# Patient Record
Sex: FEMALE | Race: WHITE | NOT HISPANIC OR LATINO | Employment: UNEMPLOYED | URBAN - METROPOLITAN AREA
[De-identification: names, ages, dates, MRNs, and addresses within clinical notes are randomized per-mention and may not be internally consistent; named-entity substitution may affect disease eponyms.]

---

## 2017-02-05 ENCOUNTER — HOSPITAL ENCOUNTER (EMERGENCY)
Facility: HOSPITAL | Age: 27
Discharge: HOME | End: 2017-02-05
Attending: EMERGENCY MEDICINE | Admitting: EMERGENCY MEDICINE

## 2017-02-05 ENCOUNTER — APPOINTMENT (OUTPATIENT)
Dept: CT IMAGING | Facility: HOSPITAL | Age: 27
End: 2017-02-05

## 2017-02-05 VITALS
SYSTOLIC BLOOD PRESSURE: 122 MMHG | RESPIRATION RATE: 16 BRPM | TEMPERATURE: 98.1 F | HEIGHT: 67 IN | DIASTOLIC BLOOD PRESSURE: 68 MMHG | BODY MASS INDEX: 27.47 KG/M2 | OXYGEN SATURATION: 100 % | WEIGHT: 175 LBS | HEART RATE: 78 BPM

## 2017-02-05 DIAGNOSIS — S06.0X0A CONCUSSION, WITHOUT LOSS OF CONSCIOUSNESS, INITIAL ENCOUNTER: ICD-10-CM

## 2017-02-05 DIAGNOSIS — S00.03XA SCALP CONTUSION: Primary | ICD-10-CM

## 2017-02-05 PROCEDURE — 70450 CT HEAD/BRAIN W/O DYE: CPT

## 2017-02-05 PROCEDURE — 99283 EMERGENCY DEPT VISIT LOW MDM: CPT

## 2017-02-05 NOTE — ED NOTES
"Patient states last night she hit her head on the corner cabinet with no loss of consciousness, and it has progressed into a headache and nausea \"and I feel unbalanced, kind of like I'm outside my body\" ever since last night, also confused, \"and my eyes feel heavy\".      Fabienne Torres RN  02/05/17 5822    "

## 2017-02-05 NOTE — ED PROVIDER NOTES
" EMERGENCY DEPARTMENT ENCOUNTER    CHIEF COMPLAINT  Chief Complaint: Head injury  History given by: Patient  History limited by: Nothing  Room Number: 01/01  PMD: Provider Not In System      HPI:  Pt is a 26 y.o. female who presents complaining of a head injury onset yesterday night. The pt states she hit her head on the corner of a cabinet. The pt states she did not have LOC, but has had HA since the injury. Pt report HA, nausea, photophobia, unsteady gait, and neck pain. The pt states she took 3 Aleve at home and it helped her pain. The pt states she has had 3 concussions in the past.    Duration: Since the injury yesterday night  Onset: Gradual  Timing: Constant  Location: Head  Radiation: None  Quality: \"Pain\"  Intensity/Severity: Moderate  Progression: Unchanged  Associated Symptoms: HA, nausea, photophobia, unsteady gait, and neck pain.  Aggravating Factors: Nothing  Alleviating Factors: Nothing  Previous Episodes: The pt has had 3 concussion in the past.  Treatment before arrival: The pt took 3 Aleve at home and it helped her pain.    PAST MEDICAL HISTORY  Active Ambulatory Problems     Diagnosis Date Noted   • No Active Ambulatory Problems     Resolved Ambulatory Problems     Diagnosis Date Noted   • No Resolved Ambulatory Problems     Past Medical History   Diagnosis Date   • Lyme disease        PAST SURGICAL HISTORY  Past Surgical History   Procedure Laterality Date   • Finger surgery         FAMILY HISTORY  History reviewed. No pertinent family history.    SOCIAL HISTORY  Social History     Social History   • Marital status: Single     Spouse name: N/A   • Number of children: N/A   • Years of education: N/A     Occupational History   • Not on file.     Social History Main Topics   • Smoking status: Never Smoker   • Smokeless tobacco: Not on file   • Alcohol use No   • Drug use: No   • Sexual activity: Not on file     Other Topics Concern   • Not on file     Social History Narrative   • No narrative on " file       ALLERGIES  Review of patient's allergies indicates no known allergies.    REVIEW OF SYSTEMS  Review of Systems   Constitutional: Negative for chills and fever.   HENT: Negative for sore throat and trouble swallowing.    Eyes: Positive for photophobia. Negative for visual disturbance.   Respiratory: Negative for cough and shortness of breath.    Cardiovascular: Negative for chest pain, palpitations and leg swelling.   Gastrointestinal: Positive for nausea. Negative for abdominal pain, diarrhea and vomiting.   Endocrine: Negative.    Genitourinary: Negative for decreased urine volume, dysuria and frequency.   Musculoskeletal: Positive for neck pain.   Skin: Negative for rash.   Allergic/Immunologic: Negative.    Neurological: Positive for headaches. Negative for syncope, weakness and numbness.        Unsteady gait   Hematological: Negative.    Psychiatric/Behavioral: Negative.    All other systems reviewed and are negative.      PHYSICAL EXAM  ED Triage Vitals   Temp Heart Rate Resp BP SpO2   02/05/17 1514 02/05/17 1514 02/05/17 1514 02/05/17 1522 02/05/17 1514   98.7 °F (37.1 °C) 97 16 114/73 99 %      Temp src Heart Rate Source Patient Position BP Location FiO2 (%)   02/05/17 1514 02/05/17 1514 -- -- --   Tympanic Monitor          Physical Exam   Constitutional: She is oriented to person, place, and time and well-developed, well-nourished, and in no distress.   HENT:   Head: Normocephalic.   Eyes: EOM are normal. Pupils are equal, round, and reactive to light.   The pt has a sensitivity to light.   Neck: Normal range of motion.   Cardiovascular: Normal rate and regular rhythm.    Pulmonary/Chest: Effort normal and breath sounds normal. No respiratory distress.   Abdominal: Soft.   Musculoskeletal: Normal range of motion. She exhibits tenderness (To the left side of the scalp).   Neurological: She is alert and oriented to person, place, and time. She has normal sensation and normal strength.   Skin: Skin  is warm and dry.   Psychiatric: Affect normal.   Nursing note and vitals reviewed.      LAB RESULTS  Lab Results (last 24 hours)     ** No results found for the last 24 hours. **          I ordered the above labs and reviewed the results    RADIOLOGY  CT Head Without Contrast - Negative acute        I ordered the above noted radiological studies. Interpreted by radiologist. Reviewed by me in PACS.       PROCEDURES  Procedures      PROGRESS AND CONSULTS  ED Course     1559  CT Head ordered for further evaluation.    1758  BP- 114/73 HR- 97 Temp- 98.7 °F (37.1 °C) (Tympanic) O2 sat- 99%    Rechecked pt, she is resting comfortably. Discussed the negative CT results with the pt. Discussed the plan to discharge the pt due to her negative work up. The pt understands and agrees with the plan.      MEDICAL DECISION MAKING  Results were reviewed/discussed with the patient and they were also made aware of online access. Pt also made aware that some labs, such as cultures, will not be resulted during ER visit and follow up with PMD is necessary.     MDM  Number of Diagnoses or Management Options  Concussion, without loss of consciousness, initial encounter:   Scalp contusion:      Amount and/or Complexity of Data Reviewed  Tests in the radiology section of CPT®: ordered and reviewed (CT Head - Negative acute)    Patient Progress  Patient progress: stable         DIAGNOSIS  Final diagnoses:   Scalp contusion   Concussion, without loss of consciousness, initial encounter       DISPOSITION  DISCHARGE    Patient discharged in stable condition.    Reviewed implications of results, diagnosis, meds, responsibility to follow up, warning signs and symptoms of possible worsening, potential complications and reasons to return to ER.    Patient/Family voiced understanding of above instructions.    Discussed plan for discharge, as there is no emergent indication for admission.  Pt/family is agreeable and understands need for follow up and  repeat testing.  Pt is aware that discharge does not mean that nothing is wrong but it indicates no emergency is present that requires admission and they must continue care with follow-up as given below or physician of their choice.     FOLLOW-UP  [facility] PHYSIC REFERRAL SERVICE  [facility] [address] [phone]             Medication List      Notice     No changes were made to your prescriptions during this visit.            Latest Documented Vital Signs:  As of 6:04 PM  BP- 114/73 HR- 97 Temp- 98.7 °F (37.1 °C) (Tympanic) O2 sat- 99%    --  Documentation assistance provided by rosie Fang for Dr. Perkins.  Information recorded by the scribe was done at my direction and has been verified and validated by me.     Homar Fang  02/05/17 1720       Homar Fang  02/05/17 1997       Ion Perkins MD  02/05/17 4732